# Patient Record
Sex: FEMALE | URBAN - NONMETROPOLITAN AREA
[De-identification: names, ages, dates, MRNs, and addresses within clinical notes are randomized per-mention and may not be internally consistent; named-entity substitution may affect disease eponyms.]

---

## 2021-11-08 ENCOUNTER — OFFICE VISIT (OUTPATIENT)
Dept: URBAN - NONMETROPOLITAN AREA CLINIC 13 | Facility: CLINIC | Age: 71
End: 2021-11-08

## 2023-10-27 ENCOUNTER — P2P PATIENT RECORD (OUTPATIENT)
Age: 73
End: 2023-10-27

## 2024-01-16 ENCOUNTER — LAB OUTSIDE AN ENCOUNTER (OUTPATIENT)
Dept: URBAN - NONMETROPOLITAN AREA CLINIC 2 | Facility: CLINIC | Age: 74
End: 2024-01-16

## 2024-01-16 ENCOUNTER — OFFICE VISIT (OUTPATIENT)
Dept: URBAN - NONMETROPOLITAN AREA CLINIC 2 | Facility: CLINIC | Age: 74
End: 2024-01-16
Payer: COMMERCIAL

## 2024-01-16 ENCOUNTER — TELEPHONE ENCOUNTER (OUTPATIENT)
Dept: URBAN - METROPOLITAN AREA CLINIC 118 | Facility: CLINIC | Age: 74
End: 2024-01-16

## 2024-01-16 ENCOUNTER — DASHBOARD ENCOUNTERS (OUTPATIENT)
Age: 74
End: 2024-01-16

## 2024-01-16 VITALS
TEMPERATURE: 98 F | HEIGHT: 59 IN | BODY MASS INDEX: 33.34 KG/M2 | WEIGHT: 165.4 LBS | HEART RATE: 80 BPM | DIASTOLIC BLOOD PRESSURE: 84 MMHG | SYSTOLIC BLOOD PRESSURE: 156 MMHG

## 2024-01-16 DIAGNOSIS — R19.7 ACUTE DIARRHEA: ICD-10-CM

## 2024-01-16 DIAGNOSIS — C20 RECTAL CANCER: ICD-10-CM

## 2024-01-16 PROBLEM — 305058001: Status: ACTIVE | Noted: 2024-01-16

## 2024-01-16 PROBLEM — 363351006: Status: ACTIVE | Noted: 2024-01-16

## 2024-01-16 PROBLEM — 236071009: Status: ACTIVE | Noted: 2024-01-16

## 2024-01-16 PROCEDURE — 99203 OFFICE O/P NEW LOW 30 MIN: CPT | Performed by: INTERNAL MEDICINE

## 2024-01-16 RX ORDER — TRAMADOL HYDROCHLORIDE AND ACETAMINOPHEN 37.5; 325 MG/1; MG/1
2 TABLETS AS NEEDED TABLET, FILM COATED ORAL
Status: ACTIVE | COMMUNITY

## 2024-01-16 RX ORDER — AMLODIPINE BESYLATE 5 MG/1
TABLET ORAL
Qty: 90 TABLET | Status: ACTIVE | COMMUNITY

## 2024-01-16 RX ORDER — METOPROLOL SUCCINATE 25 MG/1
TABLET, EXTENDED RELEASE ORAL
Qty: 180 TABLET | Status: ACTIVE | COMMUNITY

## 2024-01-16 RX ORDER — PROCHLORPERAZINE 25 MG/1
1 SUPPOSITORY AS NEEDED SUPPOSITORY RECTAL TWICE A DAY
Status: ACTIVE | COMMUNITY

## 2024-01-16 RX ORDER — SPIRONOLACTONE 25 MG/1
TABLET ORAL
Qty: 90 TABLET | Status: ACTIVE | COMMUNITY

## 2024-01-16 NOTE — HPI-TODAY'S VISIT:
1/16/2024 The patient is a 73-year-old female who is referred by Dr. Thomas Ramirez for a personal history of rectal cancer.  A copy of this consultation will be sent to the referring physician.  The patient is a 73-year-old female who states she was diagnosed with rectal cancer in May of last year.  She states she had surgery last year by Dr. Underwood for rectal cancer.  Unfortunately, we do not have any of these records today.  She did have an ileostomy for time, but now she has an anastomosis.  She underwent radiation, and she is currently on chemotherapy.  She follows with Dr. Sidhu as her oncologist.  We do not have her records either.  The patient states that she is due for repeat colonoscopy.  She was originally diagnosed by Dr. Vega.  We do have a colonoscopy that he performed in January 2023 which apparently was normal.  The patient denies any blood in her stool, but she has been having worsening symptoms of diarrhea.  She states she has loose stools every day that are now affecting her life.  Today, we have discussed starting on Metamucil along with taking a probiotic.  We are also going to obtain her previous records and get her scheduled for colonoscopy.  We will see her back in the office after her colonoscopy has been performed for further evaluation.

## 2024-05-07 ENCOUNTER — TELEPHONE ENCOUNTER (OUTPATIENT)
Dept: URBAN - NONMETROPOLITAN AREA CLINIC 2 | Facility: CLINIC | Age: 74
End: 2024-05-07

## 2024-05-07 ENCOUNTER — LAB OUTSIDE AN ENCOUNTER (OUTPATIENT)
Dept: URBAN - NONMETROPOLITAN AREA CLINIC 2 | Facility: CLINIC | Age: 74
End: 2024-05-07

## 2024-05-07 ENCOUNTER — OFFICE VISIT (OUTPATIENT)
Dept: URBAN - METROPOLITAN AREA MEDICAL CENTER 1 | Facility: MEDICAL CENTER | Age: 74
End: 2024-05-07
Payer: COMMERCIAL

## 2024-05-07 DIAGNOSIS — K52.82 EOSINOPHILIC COLITIS: ICD-10-CM

## 2024-05-07 DIAGNOSIS — K62.6 RECTAL ULCER: ICD-10-CM

## 2024-05-07 PROBLEM — 54609002: Status: ACTIVE | Noted: 2024-05-07

## 2024-05-07 PROCEDURE — 45380 COLONOSCOPY AND BIOPSY: CPT | Performed by: INTERNAL MEDICINE

## 2024-05-07 RX ORDER — SPIRONOLACTONE 25 MG/1
TABLET ORAL
Qty: 90 TABLET | Status: ACTIVE | COMMUNITY

## 2024-05-07 RX ORDER — AMLODIPINE BESYLATE 5 MG/1
TABLET ORAL
Qty: 90 TABLET | Status: ACTIVE | COMMUNITY

## 2024-05-07 RX ORDER — METOPROLOL SUCCINATE 25 MG/1
TABLET, EXTENDED RELEASE ORAL
Qty: 180 TABLET | Status: ACTIVE | COMMUNITY

## 2024-05-07 RX ORDER — PROCHLORPERAZINE 25 MG/1
1 SUPPOSITORY AS NEEDED SUPPOSITORY RECTAL TWICE A DAY
Status: ACTIVE | COMMUNITY

## 2024-05-07 RX ORDER — TRAMADOL HYDROCHLORIDE AND ACETAMINOPHEN 37.5; 325 MG/1; MG/1
2 TABLETS AS NEEDED TABLET, FILM COATED ORAL
Status: ACTIVE | COMMUNITY

## 2024-05-07 RX ORDER — MESALAMINE 1000 MG/1
1 SUPPOSITORY SUPPOSITORY RECTAL TWICE DAILY
Qty: 60 SUPPOSITORIES | Refills: 3 | OUTPATIENT
Start: 2024-05-07 | End: 2024-09-04

## 2024-05-09 LAB
AP CASE REPORT: (no result)
AP DIAGNOSIS COMMENT: (no result)
AP FINAL DIAGNOSIS: (no result)
AP GROSS DESCRIPTION: (no result)
AP MICROSCOPIC DESCRIPTION: (no result)
AP SPECIAL STAINS: (no result)

## 2024-05-14 ENCOUNTER — TELEPHONE ENCOUNTER (OUTPATIENT)
Dept: URBAN - NONMETROPOLITAN AREA CLINIC 2 | Facility: CLINIC | Age: 74
End: 2024-05-14

## 2024-05-14 RX ORDER — CIPROFLOXACIN HYDROCHLORIDE 500 MG/1
1 TABLET TABLET, FILM COATED ORAL
Qty: 28 TABLET | Refills: 0 | OUTPATIENT
Start: 2024-05-14 | End: 2024-05-28

## 2024-05-14 RX ORDER — METRONIDAZOLE 500 MG/1
1 TABLET TABLET ORAL THREE TIMES A DAY
Qty: 42 TABLET | Refills: 0 | OUTPATIENT
Start: 2024-05-14 | End: 2024-05-28

## 2024-05-29 ENCOUNTER — OFFICE VISIT (OUTPATIENT)
Dept: URBAN - METROPOLITAN AREA CLINIC 82 | Facility: CLINIC | Age: 74
End: 2024-05-29

## 2024-06-03 ENCOUNTER — TELEPHONE ENCOUNTER (OUTPATIENT)
Dept: URBAN - NONMETROPOLITAN AREA CLINIC 2 | Facility: CLINIC | Age: 74
End: 2024-06-03

## 2024-07-16 ENCOUNTER — OFFICE VISIT (OUTPATIENT)
Dept: URBAN - NONMETROPOLITAN AREA CLINIC 2 | Facility: CLINIC | Age: 74
End: 2024-07-16

## 2024-07-22 ENCOUNTER — TELEPHONE ENCOUNTER (OUTPATIENT)
Dept: URBAN - NONMETROPOLITAN AREA CLINIC 2 | Facility: CLINIC | Age: 74
End: 2024-07-22

## 2024-08-01 ENCOUNTER — OFFICE VISIT (OUTPATIENT)
Dept: URBAN - METROPOLITAN AREA MEDICAL CENTER 1 | Facility: MEDICAL CENTER | Age: 74
End: 2024-08-01